# Patient Record
Sex: FEMALE | Race: WHITE | Employment: UNEMPLOYED | URBAN - METROPOLITAN AREA
[De-identification: names, ages, dates, MRNs, and addresses within clinical notes are randomized per-mention and may not be internally consistent; named-entity substitution may affect disease eponyms.]

---

## 2020-08-29 ENCOUNTER — HOSPITAL ENCOUNTER (EMERGENCY)
Age: 50
Discharge: HOME | End: 2020-08-29
Attending: EMERGENCY MEDICINE
Payer: COMMERCIAL

## 2020-08-29 VITALS
HEIGHT: 72 IN | WEIGHT: 169 LBS | TEMPERATURE: 98.6 F | DIASTOLIC BLOOD PRESSURE: 84 MMHG | RESPIRATION RATE: 16 BRPM | OXYGEN SATURATION: 97 % | HEART RATE: 81 BPM | SYSTOLIC BLOOD PRESSURE: 151 MMHG | BODY MASS INDEX: 22.89 KG/M2

## 2020-08-29 PROCEDURE — 99282 EMERGENCY DEPT VISIT SF MDM: CPT

## 2020-08-29 RX ORDER — MUPIROCIN CALCIUM 20 MG/G
CREAM TOPICAL
Qty: 1 TUBE | Refills: 0 | Status: SHIPPED | OUTPATIENT
Start: 2020-08-29 | End: 2020-09-28

## 2020-08-29 NOTE — ED PROVIDER NOTES
 Anemia     Anxiety     GERD (gastroesophageal reflux disease)     Headache(784.0)     Kidney stones     Osteoarthritis     [facility] arter[facility] ([facility] [address] [facility]     Seizures ([facility] [address] [facility]     Stroke (cerebrum) ([facility] [address] [facility]     Ulcer          SURGICALHISTORY       Past Surgical History:   Procedure Laterality Date    CHOLECYSTECTOMY, LAPAROSCOPIC  2014    COLONOSCOPY      ENDOSCOPY, COLON, DIAGNOSTIC      TONSILLECTOMY      TUBAL LIGATION           CURRENT MEDICATIONS       Discharge Medication List as of 2020  5:54 PM      CONTINUE these medications which have NOT CHANGED    Details   ibuprofen (ADVIL;MOTRIN) 800 MG tablet Take 1 tablet by mouth every 8 hours as needed for Pain, Disp-30 tablet, R-0      simvastatin (ZOCOR) 20 MG tablet Take 1 tablet by mouth nightly, Disp-30 tablet, R-3      amitriptyline (ELAVIL) 25 MG tablet Take 1 tablet by mouth nightly, Disp-30 tablet, R-3      venlafaxine (EFFEXOR XR) 150 MG XR capsule Take 1 capsule by mouth daily, Disp-30 capsule, R-3      omeprazole (PRILOSEC) 20 MG capsule Take 20 mg by mouth 2 times daily                  Morphine    FAMILY HISTORY     History reviewed. No pertinent family history.        SOCIAL HISTORY       Social History     Socioeconomic History    Marital status:      Spouse name: None    Number of children: None    Years of education: None    Highest education level: None   Occupational History    None   Social Needs    Financial resource strain: None    Food insecurity     Worry: None     Inability: None    Transportation needs     Medical: None     Non-medical: None   Tobacco Use    Smoking status: Former Smoker     Last attempt to quit: 2014     Years since quittin.6    Smokeless tobacco: Never Used   Substance and Sexual Activity    Alcohol use: No     Alcohol/week: 0.0 standard drinks    Drug use: Yes     Types: Marijuana, Methamphetamines    Sexual activity: Yes     Partners: Male   Lifestyle    Physical activity     Days per week: None     Minutes per session: None    Stress: None   Relationships    Social connections     Talks on phone: None     Gets together: None     Attends Jainism service: None     Active member of club or organization: None     Attends meetings of clubs or organizations: None     Relationship status: None    Intimate partner violence     Fear of current or ex partner: None     Emotionally abused: None     Physically abused: None     Forced sexual activity: None   Other Topics Concern    None   Social History Narrative    None       SCREENINGS    [place] Coma Scale  Eye Opening: Spontaneous  Best Verbal Response: Oriented  Best Motor Response: Obeys commands  Tony Coma Scale Score: 15        PHYSICAL EXAM    (up to 7 for level 4, 8 or more for level 5)     ED Triage Vitals [08/29/20 1515]   BP Temp Temp Source Pulse Resp SpO2 Height Weight   (!) 151/84 98.6 °F (37 °C) Oral 81 16 97 % 6' 1\" (1.854 m) 169 lb (76.7 kg)       General appearance: Patient appears slightly anxious/fidgety. Skin:  Warm. Dry. 3 small raised lesions on the volar aspect of right forearm 2 to 3 mm. Lesions are minimally raised and are mildly erythematous. No surrounding induration, erythema and no fluctuance of lesions. Mild excoriations of volar aspect of forearm. Eye:  Extraocular movements intact. Ears, nose, mouth and throat:  Oral mucosa moist   Neck:  Trachea midline. Extremity:  No swelling. Normal ROM     Heart:  Regular rate and rhythm, normal S1 & S2, no extra heart sounds. Perfusion:  intact  Respiratory:  Lungs clear to auscultation bilaterally. Respirations nonlabored. Abdominal:  Normal bowel sounds. Soft. Nontender. Non distended. Neurological:  Alert and oriented times 3. Psychiatric: Patient does appear anxious; patient reports tactile hallucinations; she reports things are crawling under her skin asking me to look and confirm it. No pressured speech.   No tangential thought process. Seems to have insight other than tactile hallucinations. No auditory visual hallucinations. DIAGNOSTIC RESULTS     EKG: All EKG's are interpreted by the Emergency Department Physician who either signs or Co-signs this chart in the absence of a cardiologist.      RADIOLOGY:   Plain film images such as CT, Ultrasound and MRI are read by the radiologist. Plain radiographic images are visualized and preliminarily interpreted by the emergency physician with the below findings:      Interpretation per the Radiologist below, ifavailable at the time of this note:    No orders to display         ED BEDSIDE ULTRASOUND:   Performed by ED Physician - none    LABS:  Labs Reviewed - No data to display    All other labs were within normal range ornot returned as of this dictation. EMERGENCY DEPARTMENT COURSE and DIFFERENTIAL DIAGNOSIS/MDM:   Vitals:    Vitals:    08/29/20 1515   BP: (!) 151/84   Pulse: 81   Resp: 16   Temp: 98.6 °F (37 °C)   TempSrc: Oral   SpO2: 97%   Weight: 169 lb (76.7 kg)   Height: 6' 1\" (1.854 m)         MDM  Number of Diagnoses or Management Options  Dermatitis:     52-year-old female with report of parasites/worms crawling out of her arms and under her skin. Patient had three 2 to 3 mm erythematous lesions that were slightly raised on her left arm with mild excoriations around them. No warmth or induration surrounding lesions. No areas of fluctuance. Patient most likely with tactile hallucinations. Will treat dermatitis/excoriations with steroid cream and cover with mupirocin. Lesions are consistent with healing insect bites with local inflammatory response. Discussed importance of following up with her primary care physician for further discussion and possible referral to dermatology and psychiatry. Patient acknowledged understanding and agreement with plan of care. She was discharged in good condition with stable vitals.     FINAL IMPRESSION      1. Dermatitis          DISPOSITION/PLAN   DISPOSITION Decision To Discharge 08/29/2020 05:51:02 PM      PATIENT REFERRED TO:  [id number] [id number] [phone]  Call   call to establish care      DISCHARGE MEDICATIONS:  Discharge Medication List as of 8/29/2020  5:54 PM      START taking these medications    Details   mupirocin (BACTROBAN) 2 % cream Apply topically 3 times daily. , Disp-1 Tube,R-0, Print      triamcinolone (KENALOG) 0.1 % ointment Apply topically 2 times daily for 7 days, Topical, 2 TIMES DAILY Starting Sat 8/29/2020, Until Sat 9/5/2020, For 7 days, Disp-1 Tube,R-0, Print                    (Please note that portions of this note were completed with a voice recognition program.  Efforts were made to edit the dictations but occasionally words are mis-transcribed. )      Shilpa Buckner MD (electronically signed)  Attending Emergency Physician          Shilpa Buckner MD  08/30/20 0862